# Patient Record
Sex: MALE | Race: WHITE | Employment: FULL TIME | URBAN - METROPOLITAN AREA
[De-identification: names, ages, dates, MRNs, and addresses within clinical notes are randomized per-mention and may not be internally consistent; named-entity substitution may affect disease eponyms.]

---

## 2017-11-08 ENCOUNTER — OFFICE VISIT (OUTPATIENT)
Dept: GASTROENTEROLOGY | Facility: CLINIC | Age: 57
End: 2017-11-08

## 2017-11-08 VITALS
HEIGHT: 74.5 IN | SYSTOLIC BLOOD PRESSURE: 107 MMHG | DIASTOLIC BLOOD PRESSURE: 67 MMHG | WEIGHT: 198.19 LBS | HEART RATE: 71 BPM | BODY MASS INDEX: 25.17 KG/M2

## 2017-11-08 DIAGNOSIS — K40.90 LEFT INGUINAL HERNIA: Primary | ICD-10-CM

## 2017-11-08 DIAGNOSIS — Z86.010 HISTORY OF COLON POLYPS: ICD-10-CM

## 2017-11-08 PROCEDURE — 99213 OFFICE O/P EST LOW 20 MIN: CPT | Performed by: INTERNAL MEDICINE

## 2017-11-08 NOTE — PROGRESS NOTES
HPI:    Patient ID: Chidi Shields is a 59year old man known to me from his care in my previous practice which included a colonoscopy examination in 2011.   I do not have records of that exam.  He recalls an essentially negative exam.  He believes he was and bleeding repeatedly flares up and regresses. More than anything, this had him concerned seeking at least a limited evaluation today.     No melena. He and his wife are trying to lose weight.   He has increased fiber content in his diet after finding Me

## 2017-11-30 PROBLEM — K40.90 LEFT INGUINAL HERNIA: Status: ACTIVE | Noted: 2017-11-30

## 2017-11-30 PROBLEM — K40.90 RIGHT INGUINAL HERNIA: Status: ACTIVE | Noted: 2017-11-30

## 2017-12-11 ENCOUNTER — TELEPHONE (OUTPATIENT)
Dept: GASTROENTEROLOGY | Facility: CLINIC | Age: 57
End: 2017-12-11

## 2017-12-11 DIAGNOSIS — Z86.010 PERSONAL HISTORY OF COLONIC POLYPS: Primary | ICD-10-CM

## 2017-12-11 NOTE — TELEPHONE ENCOUNTER
I spoke to [facility] in [place].  They are sending me the Colonoscopy report from May 2011([phone])

## 2018-01-02 NOTE — TELEPHONE ENCOUNTER
I spoke to Alie Singleton at [facility] gastroenterology and she states she will send me the Op-report for pt's Colonoscopy from 2011.

## 2018-01-08 NOTE — TELEPHONE ENCOUNTER
Last Procedure:  Colonoscopy 05/16/2011  Last Diagnosis:  Internal hemorrhoids, normal exam to the cecum and ileum  Recalled for (years): 7-10 years  Sedation used previously:  MAC  Last Prep Used (if known):    Quality of prep (if known):     Anticoagulant

## 2018-01-08 NOTE — TELEPHONE ENCOUNTER
Pt calling to see if office has received information from [address] [place] [address]. Please call thank you [phone].

## 2018-01-09 NOTE — TELEPHONE ENCOUNTER
Scheduled for:  Colonoscopy - 38226  Provider Name:  Dr. Hall Listen  Date:  5/23/18  Location:  [facility]  Sedation:  MAC  Time:  1:00 pm (pt is aware to arrive at 12:00 pm)  Prep:  Suprep, Prep instructions were given to pt over the phone, pt verbalized under

## 2018-01-09 NOTE — TELEPHONE ENCOUNTER
My Colonoscopy op report of 5/16/2011 received and reviewed. Colonoscopy was performed for personal history of colon polyps, rectal pain. Colonoscopy examination to the terminal ileum with good prep quality showed only internal hemorrhoids.      Dilma Sahu

## 2018-01-09 NOTE — TELEPHONE ENCOUNTER
Schedule colonoscopy exam at [facility] ([facility]    BMI 25    This patient IS appropriate for the [facility] endoscopy center.     MAC anesthesia    Suprep small volume bowel prep if covered by insurance, otherwise Golytely (PE

## 2018-02-16 ENCOUNTER — TELEPHONE (OUTPATIENT)
Dept: GASTROENTEROLOGY | Facility: CLINIC | Age: 58
End: 2018-02-16

## 2018-02-16 DIAGNOSIS — Z86.010 HISTORY OF COLONIC POLYPS: Primary | ICD-10-CM

## 2018-02-16 NOTE — TELEPHONE ENCOUNTER
Rescheduled for:  Colonoscopy 44172  Provider Name: Dr. Paul Hummel  Date:    From-5/23/18  To-5/16/18  Location:  [address] [place] [address]  Sedation:  MAC  Time:  1300 (pt is aware to arrive at 1200)   Prep:  Suprep, mailed 2/16/18 with codes  Meds/Allergies Reconciled?:  Physici

## 2018-02-16 NOTE — TELEPHONE ENCOUNTER
Lmtcb regarding his Procedure /Colonoscopy on 5/23/18 and needs to be rescheduled ,Offered dates on 5/16/18 anytime of that day,its wide open for scheduling.

## 2018-03-01 PROBLEM — K40.90 RIGHT INGUINAL HERNIA: Status: RESOLVED | Noted: 2017-11-30 | Resolved: 2018-03-01

## 2018-03-01 PROBLEM — K40.21 BILATERAL RECURRENT INGUINAL HERNIA WITHOUT OBSTRUCTION OR GANGRENE: Status: ACTIVE | Noted: 2018-03-01

## 2018-03-01 PROBLEM — K40.90 LEFT INGUINAL HERNIA: Status: RESOLVED | Noted: 2017-11-30 | Resolved: 2018-03-01

## 2018-03-20 ENCOUNTER — CHARTING TRANS (OUTPATIENT)
Dept: OTHER | Age: 58
End: 2018-03-20

## 2018-05-08 ENCOUNTER — TELEPHONE (OUTPATIENT)
Dept: GASTROENTEROLOGY | Facility: CLINIC | Age: 58
End: 2018-05-08

## 2018-05-08 DIAGNOSIS — Z86.010 HISTORY OF COLONIC POLYPS: Primary | ICD-10-CM

## 2018-05-08 NOTE — TELEPHONE ENCOUNTER
I did contact this patient today to inform him that his procedure time has changed which he agreed with this plan.  I am sending new instructions via [employer] today    Rescheduled for:  Colonoscopy - 15960  Provider Name:  Dr. Brown Bermeo  Date:  5/23/18  [place]

## 2018-05-15 ENCOUNTER — TELEPHONE (OUTPATIENT)
Dept: GASTROENTEROLOGY | Facility: CLINIC | Age: 58
End: 2018-05-15

## 2018-05-15 NOTE — TELEPHONE ENCOUNTER
Rabia Trevino, the pharmacist called asking for bowel prep orders. I gave a verbal order for Suprep  See telephone encounter from 02/16/18.  He will call back if the Suprep is too expensive

## 2018-05-15 NOTE — TELEPHONE ENCOUNTER
Whit Rasmussen, the pharmacist called back. Suprep is too expensive. Gave v.o. For Golytely per written order    Suprep small volume bowel prep if covered by insurance, otherwise Golytely (PEG) 4L bowel prep;  Rx sent to [facility]

## 2018-05-16 ENCOUNTER — SURGERY (OUTPATIENT)
Age: 58
End: 2018-05-16

## 2018-05-16 ENCOUNTER — HOSPITAL ENCOUNTER (OUTPATIENT)
Age: 58
Setting detail: HOSPITAL OUTPATIENT SURGERY
Discharge: HOME | End: 2018-05-16
Attending: INTERNAL MEDICINE | Admitting: INTERNAL MEDICINE
Payer: COMMERCIAL

## 2018-05-16 ENCOUNTER — ANESTHESIA EVENT (OUTPATIENT)
Dept: ENDOSCOPY | Age: 58
End: 2018-05-16
Payer: COMMERCIAL

## 2018-05-16 ENCOUNTER — ANESTHESIA (OUTPATIENT)
Dept: ENDOSCOPY | Age: 58
End: 2018-05-16
Payer: COMMERCIAL

## 2018-05-16 VITALS
WEIGHT: 190 LBS | BODY MASS INDEX: 24.38 KG/M2 | RESPIRATION RATE: 15 BRPM | OXYGEN SATURATION: 97 % | DIASTOLIC BLOOD PRESSURE: 72 MMHG | HEART RATE: 57 BPM | SYSTOLIC BLOOD PRESSURE: 114 MMHG | HEIGHT: 74 IN

## 2018-05-16 DIAGNOSIS — Z86.010 PERSONAL HISTORY OF COLONIC POLYPS: ICD-10-CM

## 2018-05-16 PROCEDURE — 45378 DIAGNOSTIC COLONOSCOPY: CPT | Performed by: INTERNAL MEDICINE

## 2018-05-16 RX ORDER — SODIUM CHLORIDE, SODIUM LACTATE, POTASSIUM CHLORIDE, CALCIUM CHLORIDE 600; 310; 30; 20 MG/100ML; MG/100ML; MG/100ML; MG/100ML
INJECTION, SOLUTION INTRAVENOUS CONTINUOUS
Status: DISCONTINUED | OUTPATIENT
Start: 2018-05-16 | End: 2018-05-16

## 2018-05-16 RX ORDER — SODIUM CHLORIDE, SODIUM LACTATE, POTASSIUM CHLORIDE, CALCIUM CHLORIDE 600; 310; 30; 20 MG/100ML; MG/100ML; MG/100ML; MG/100ML
INJECTION, SOLUTION INTRAVENOUS CONTINUOUS
Status: CANCELLED | OUTPATIENT
Start: 2018-05-16

## 2018-05-16 RX ORDER — NALOXONE HYDROCHLORIDE 0.4 MG/ML
80 INJECTION, SOLUTION INTRAMUSCULAR; INTRAVENOUS; SUBCUTANEOUS AS NEEDED
Status: CANCELLED | OUTPATIENT
Start: 2018-05-16 | End: 2018-05-16

## 2018-05-16 RX ADMIN — SODIUM CHLORIDE, SODIUM LACTATE, POTASSIUM CHLORIDE, CALCIUM CHLORIDE: 600; 310; 30; 20 INJECTION, SOLUTION INTRAVENOUS at 09:20:00

## 2018-05-16 RX ADMIN — SODIUM CHLORIDE, SODIUM LACTATE, POTASSIUM CHLORIDE, CALCIUM CHLORIDE: 600; 310; 30; 20 INJECTION, SOLUTION INTRAVENOUS at 08:55:00

## 2018-05-16 NOTE — OPERATIVE REPORT
COLONOSCOPY PROCEDURE REPORT     DATE OF PROCEDURE:  5/16/2018     PCP: Erin Morin MD     PREOPERATIVE DIAGNOSIS: Screening colonoscopy examination     POSTOPERATIVE DIAGNOSIS:  See impression. SURGEON:  VERONICA Webb:

## 2018-05-16 NOTE — H&P
History & Physical Examination    Patient Name: Shoaib Bustillo  MRN: [id number]  CSN: [id number]  YOB: 1960    Diagnosis: Screening colonoscopy examination    Present Illness: Gabino Marcus is here for his second screening colonoscopy examination wi

## 2018-05-16 NOTE — ANESTHESIA PREPROCEDURE EVALUATION
Anesthesia PreOp Note    HPI:     Irma Gasca is a 59year old male who presents for preoperative consultation requested by: Kamila Rodriguez MD    Date of Surgery: 5/16/2018    Procedure(s):  COLONOSCOPY  Indication: Personal history of colon index is 24.39 kg/m². height is 1.88 m (6' 2\") and weight is 86.2 kg (190 lb). His blood pressure is 125/70 and his pulse is 69. His respiration is 14 and oxygen saturation is 96%.     05/16/18  0723   BP: 125/70   BP Location: Left arm   Pulse: 69   Res

## 2018-05-16 NOTE — ANESTHESIA POSTPROCEDURE EVALUATION
Patient: Nancy Dorsey    Procedure Summary     Date:  05/16/18 Room / Location:  [facility] ENDOSCOPY 01 / [facility] ENDO    Anesthesia Start:  8677 Anesthesia Stop:      Procedure:  COLONOSCOPY (N/A ) Diagnosis:       Personal history of colonic polyps      (in

## 2019-10-18 ENCOUNTER — APPOINTMENT (OUTPATIENT)
Dept: GENERAL RADIOLOGY | Age: 59
End: 2019-10-18
Attending: PHYSICIAN ASSISTANT
Payer: COMMERCIAL

## 2019-10-18 ENCOUNTER — HOSPITAL ENCOUNTER (OUTPATIENT)
Age: 59
Discharge: HOME | End: 2019-10-18
Payer: COMMERCIAL

## 2019-10-18 VITALS
HEART RATE: 62 BPM | DIASTOLIC BLOOD PRESSURE: 69 MMHG | TEMPERATURE: 98 F | RESPIRATION RATE: 16 BRPM | OXYGEN SATURATION: 98 % | SYSTOLIC BLOOD PRESSURE: 129 MMHG

## 2019-10-18 DIAGNOSIS — R06.00 DYSPNEA, UNSPECIFIED TYPE: ICD-10-CM

## 2019-10-18 DIAGNOSIS — J20.9 ACUTE BRONCHITIS, UNSPECIFIED ORGANISM: Primary | ICD-10-CM

## 2019-10-18 PROCEDURE — 99213 OFFICE O/P EST LOW 20 MIN: CPT

## 2019-10-18 PROCEDURE — 99204 OFFICE O/P NEW MOD 45 MIN: CPT

## 2019-10-18 PROCEDURE — 94664 DEMO&/EVAL PT USE INHALER: CPT

## 2019-10-18 PROCEDURE — 71046 X-RAY EXAM CHEST 2 VIEWS: CPT | Performed by: PHYSICIAN ASSISTANT

## 2019-10-18 RX ORDER — ALBUTEROL SULFATE 90 UG/1
2 AEROSOL, METERED RESPIRATORY (INHALATION) EVERY 4 HOURS PRN
Qty: 1 INHALER | Refills: 0 | Status: SHIPPED | OUTPATIENT
Start: 2019-10-18 | End: 2019-11-17

## 2019-10-18 RX ORDER — PREDNISONE 20 MG/1
40 TABLET ORAL DAILY
Qty: 10 TABLET | Refills: 0 | Status: SHIPPED | OUTPATIENT
Start: 2019-10-18 | End: 2019-10-23

## 2019-10-18 NOTE — ED INITIAL ASSESSMENT (HPI)
Cough-  Started Saturday  Pt has been taking mucinex  2 x per day  Per pt Tuesday  Wife called 911 due pt having trouble breathing. Per  Pt his EKG was normal and O2 sat normal . Pt refused to be taken to the ER, they prefer to just observe symptoms.   Pt

## 2019-10-18 NOTE — ED PROVIDER NOTES
Patient Seen in: [facility]      History   Patient presents with:  Shortness Of Breath  Cough    Stated Complaint: chest congestion    HPI    Marcella Brewer is a 28-year-old male who presents today for evaluation of shortness of breath. Diagnosis Date   • Colon polyp               Past Surgical History:   Procedure Laterality Date   • APPENDECTOMY  at age 10   • COLONOSCOPY  5/11   • COLONOSCOPY N/A 5/16/2018    Performed by Kamila Rodriguez MD at [place] [address] sensation. Skin: Skin is warm and dry. No rash noted. No erythema. ED Course   Labs Reviewed - No data to display       I have an extensive conversation with the patient and his wife regarding a proper work up for dyspnea.   The patient initially sa patient and his wife. I again offered further evaluation and patient refused. I strongly encouraged them to proceed to the emergency department promptly if symptoms worsen. I plan to discharge him with 5 days of prednisone and an albuterol inhaler.   [facility]

## 2019-12-16 ENCOUNTER — OFFICE VISIT (OUTPATIENT)
Dept: GASTROENTEROLOGY | Facility: CLINIC | Age: 59
End: 2019-12-16
Payer: COMMERCIAL

## 2019-12-16 VITALS
HEART RATE: 65 BPM | DIASTOLIC BLOOD PRESSURE: 82 MMHG | BODY MASS INDEX: 25.22 KG/M2 | SYSTOLIC BLOOD PRESSURE: 133 MMHG | HEIGHT: 74.5 IN | WEIGHT: 198.63 LBS

## 2019-12-16 DIAGNOSIS — R14.2 BELCHING SYMPTOM: ICD-10-CM

## 2019-12-16 DIAGNOSIS — K21.9 GASTROESOPHAGEAL REFLUX DISEASE, ESOPHAGITIS PRESENCE NOT SPECIFIED: ICD-10-CM

## 2019-12-16 DIAGNOSIS — R10.13 DYSPEPSIA: ICD-10-CM

## 2019-12-16 DIAGNOSIS — K40.20 NON-RECURRENT BILATERAL INGUINAL HERNIA WITHOUT OBSTRUCTION OR GANGRENE: Primary | ICD-10-CM

## 2019-12-16 DIAGNOSIS — K92.89 GAS BLOAT SYNDROME: ICD-10-CM

## 2019-12-16 PROCEDURE — 99212 OFFICE O/P EST SF 10 MIN: CPT | Performed by: INTERNAL MEDICINE

## 2019-12-16 PROCEDURE — 99214 OFFICE O/P EST MOD 30 MIN: CPT | Performed by: INTERNAL MEDICINE

## 2019-12-16 RX ORDER — ASPIRIN 325 MG
1 TABLET ORAL AS NEEDED
COMMUNITY
End: 2019-12-21

## 2019-12-16 NOTE — PROGRESS NOTES
HPI:    Patient ID: Caden Aguilera is a 62year old man known to me from his care in my previous practice out in [place] which included a colonoscopy examination in 2011.   He just returned May 2018 for a follow-up screening colonoscopy examination dagoberto meals.    In the past few weeks, has discontinued dairy including his daily yogurt consumption as well as coffee. Of uncertain significance, suffered a fairly severe URI, bronchitis syndrome in mid October.   Apparently he was in donato respiratory distre records of that exam.  He recalls an essentially negative exam.  He believes he was recommended a 5 year follow-up colonoscopy examination.     Mr Bella Farley returns today to discuss the colonoscopy examination and a new bulge sensation in his left lower abdom trying to lose weight. He has increased fiber content in his diet after finding Metamucil unpalatable.   He is eating prunes which work for him.    ====================    COLONOSCOPY PROCEDURE REPORT     DATE OF PROCEDURE:  5/16/2018      PCP: Dora Farrell times daily. (Patient not taking: Reported on 12/16/2019 ) 20 capsule 0   • Triamcinolone Acetonide 55 MCG/ACT Nasal Aerosol 2 sprays by Nasal route daily.  (Patient not taking: Reported on 12/16/2019 ) 1 Inhaler 0   • Na Sulfate-K Sulfate-Mg Sulf (SUPREP B consumption. · There is a component of GERD symptoms here. Mr. Hannah Dickson does take regular helpings of citrus and cooked tomatoes, tomato sauces. Today we discussed cutting back or eliminating those triggers.   It sounds like this couple loves tomato sauce Prescriptions      No prescriptions requested or ordered in this encounter       Imaging & Referrals:  None       AV#[id number]

## 2019-12-21 ENCOUNTER — HOSPITAL ENCOUNTER (EMERGENCY)
Age: 59
Discharge: HOME | End: 2019-12-21
Attending: EMERGENCY MEDICINE
Payer: COMMERCIAL

## 2019-12-21 VITALS
HEIGHT: 74 IN | SYSTOLIC BLOOD PRESSURE: 123 MMHG | WEIGHT: 195 LBS | DIASTOLIC BLOOD PRESSURE: 64 MMHG | TEMPERATURE: 98 F | RESPIRATION RATE: 16 BRPM | HEART RATE: 68 BPM | OXYGEN SATURATION: 99 % | BODY MASS INDEX: 25.03 KG/M2

## 2019-12-21 DIAGNOSIS — S61.012A LACERATION OF LEFT THUMB WITHOUT FOREIGN BODY WITHOUT DAMAGE TO NAIL, INITIAL ENCOUNTER: Primary | ICD-10-CM

## 2019-12-21 DIAGNOSIS — S61.209A OPEN WOUND OF FINGER WITH TENDON INJURY, INITIAL ENCOUNTER: ICD-10-CM

## 2019-12-21 PROCEDURE — 99283 EMERGENCY DEPT VISIT LOW MDM: CPT

## 2019-12-21 PROCEDURE — 12002 RPR S/N/AX/GEN/TRNK2.6-7.5CM: CPT

## 2019-12-21 PROCEDURE — 12002 RPR S/N/AX/GEN/TRNK2.6-7.5CM: CPT | Performed by: PHYSICIAN ASSISTANT

## 2019-12-21 RX ORDER — CEPHALEXIN 500 MG/1
500 CAPSULE ORAL 2 TIMES DAILY
Qty: 20 CAPSULE | Refills: 0 | Status: SHIPPED | OUTPATIENT
Start: 2019-12-21 | End: 2019-12-31

## 2019-12-21 NOTE — ED PROVIDER NOTES
Patient Seen in: THE [facility] Emergency Department In [place]      History   Patient presents with:  Laceration Abrasion    Stated Complaint: LEFT THUMB LAC. LAST TD 2011    HPI    Patient is a pleasant right-hand-dominant male.   He arrives to the emergenc retraction,   Extremities: Midline dorsal left thumb laceration between the MCP and PIP joint. When explored, the extensor tendon is visible and does appear to have superficial damage. Full active range of motion with good strength. Normal cap refill. 0

## 2019-12-21 NOTE — ED PROVIDER NOTES
I reviewed that chart and discussed the case. I have examined the patient and noted patient noted left thumb over the dorsum of the thumb proximal phalanx. Neurovascular [place] area of injury no foreign body sensation.   Lungs normal cardiovascular abdomen

## 2020-12-29 PROBLEM — K40.20 BILATERAL INGUINAL HERNIA: Status: ACTIVE | Noted: 2018-03-01

## 2020-12-30 PROBLEM — E78.2 MIXED HYPERLIPIDEMIA: Status: ACTIVE | Noted: 2020-12-30

## 2021-04-21 ENCOUNTER — TELEPHONE (OUTPATIENT)
Dept: GASTROENTEROLOGY | Facility: CLINIC | Age: 61
End: 2021-04-21

## 2021-04-21 ENCOUNTER — OFFICE VISIT (OUTPATIENT)
Dept: GASTROENTEROLOGY | Facility: CLINIC | Age: 61
End: 2021-04-21
Payer: COMMERCIAL

## 2021-04-21 VITALS
SYSTOLIC BLOOD PRESSURE: 115 MMHG | HEIGHT: 74.5 IN | WEIGHT: 198.19 LBS | HEART RATE: 62 BPM | DIASTOLIC BLOOD PRESSURE: 72 MMHG | BODY MASS INDEX: 25.17 KG/M2

## 2021-04-21 DIAGNOSIS — R10.13 DYSPEPSIA: ICD-10-CM

## 2021-04-21 DIAGNOSIS — R10.13 EPIGASTRIC ABDOMINAL PAIN: Primary | ICD-10-CM

## 2021-04-21 DIAGNOSIS — R14.2 BELCHING SYMPTOM: ICD-10-CM

## 2021-04-21 DIAGNOSIS — R68.81 EARLY SATIETY: ICD-10-CM

## 2021-04-21 DIAGNOSIS — K21.9 GASTROESOPHAGEAL REFLUX DISEASE, UNSPECIFIED WHETHER ESOPHAGITIS PRESENT: ICD-10-CM

## 2021-04-21 PROCEDURE — 3008F BODY MASS INDEX DOCD: CPT | Performed by: INTERNAL MEDICINE

## 2021-04-21 PROCEDURE — 3074F SYST BP LT 130 MM HG: CPT | Performed by: INTERNAL MEDICINE

## 2021-04-21 PROCEDURE — 99214 OFFICE O/P EST MOD 30 MIN: CPT | Performed by: INTERNAL MEDICINE

## 2021-04-21 PROCEDURE — 3078F DIAST BP <80 MM HG: CPT | Performed by: INTERNAL MEDICINE

## 2021-04-21 NOTE — PROGRESS NOTES
HPI:    Patient ID: Vivek Betancourt returns today with his very supportive wife for follow-up. As per previous visit, he continues to suffer significant somewhat atypical upper abdominal gas dyspepsia symptoms.     This symptom clearly has him in signifi out to be bilateral mildly symptomatic inguinal hernias. He saw Dr. Andrez Daley of Surgery. Ultimately he chose not to proceed with the surgeries at this point.   He is aware of bilateral hernias but they are not especially troubling and he does not find him show hyperinflation. Prescribed a 5-day course of prednisone on 10/18/2019, then cefdinir antibiotic on 11/13/2019. Recently has been taking OTC Phazyme simethicone product which is helping significantly. Has not tried antacids at this point.     Never bicycle ride in mid July 2017. He had significant discomfort for several weeks which has gradually improved. It felt like a muscle strain. There has been a soft protuberant bulge in that area ever since.   So far this does not appear to be aggravated by POSTOPERATIVE DIAGNOSIS:  See impression. SURGEON:  Christopher A. Bert Keys, M.D. Bianca JulianMortimer Roers anesthesia provided by the Anesthesia Service.   MAC anesthesia requested due to anticipated intolerance of procedure under conscious sedation medicat index is 25.11 kg/m². He returns for follow-up 11/8/2017 to discuss a likely left inguinal hernia. Physical exam suggestive of inguinal hernia. So far this is minimally symptomatic with no incarceration symptoms or events.     Consulted with Dr. Josh Johnson possibility of coronary disease. · Some relief from Phazyme/Gas-x. · We previously discussed the risks and benefits of PPI medication 12/16/2019 visit.    · Today I recommended and ordered upper GI x-ray, rule out paraesophageal hernia  · Today I recomm

## 2021-04-21 NOTE — PATIENT INSTRUCTIONS
1. Schedule abdominal ultrasound exam and \"Upper GI X-Ray\" tests    2. Schedule EGD (stomach endoscopy) exam at [facility])    Body mass index is 25.11 kg/m².     MAC anesthesia    DX = GERD, dyspepsia, epigastric abd pain

## 2021-04-21 NOTE — TELEPHONE ENCOUNTER
Scheduled for:  Renetta Norris  Provider Name:  Indiana Veras  Date:  7/8/21  Location:  [facility]  Sedation:  Mac  Time:  0845 Am , (pt is aware that Maggie 150 will call the day before to confirm arrival time)  Prep:  Npo 3 hrs before prior to procedure arrival  Meds/Allergi

## 2021-04-22 ENCOUNTER — LAB ENCOUNTER (OUTPATIENT)
Dept: LAB | Facility: HOSPITAL | Age: 61
End: 2021-04-22
Attending: INTERNAL MEDICINE
Payer: COMMERCIAL

## 2021-04-22 DIAGNOSIS — R10.13 EPIGASTRIC ABDOMINAL PAIN: ICD-10-CM

## 2021-04-22 DIAGNOSIS — R68.81 EARLY SATIETY: ICD-10-CM

## 2021-04-22 DIAGNOSIS — R10.13 DYSPEPSIA: ICD-10-CM

## 2021-04-22 DIAGNOSIS — R14.2 BELCHING SYMPTOM: ICD-10-CM

## 2021-04-24 ENCOUNTER — HOSPITAL ENCOUNTER (OUTPATIENT)
Dept: GENERAL RADIOLOGY | Facility: HOSPITAL | Age: 61
Discharge: HOME | End: 2021-04-24
Attending: INTERNAL MEDICINE
Payer: COMMERCIAL

## 2021-04-24 DIAGNOSIS — R68.81 EARLY SATIETY: ICD-10-CM

## 2021-04-24 DIAGNOSIS — R10.13 DYSPEPSIA: ICD-10-CM

## 2021-04-24 DIAGNOSIS — R14.2 BELCHING SYMPTOM: ICD-10-CM

## 2021-04-24 DIAGNOSIS — R10.13 EPIGASTRIC ABDOMINAL PAIN: ICD-10-CM

## 2021-04-24 PROCEDURE — 74246 X-RAY XM UPR GI TRC 2CNTRST: CPT | Performed by: INTERNAL MEDICINE

## 2021-04-29 ENCOUNTER — PATIENT MESSAGE (OUTPATIENT)
Dept: GASTROENTEROLOGY | Facility: CLINIC | Age: 61
End: 2021-04-29

## 2021-04-29 NOTE — TELEPHONE ENCOUNTER
Patient would like to discuss test results. Please advise. Thank you    FINDINGS:     ESOPHAGUS:  No visible obstruction, dilatation, reflux or hernia.    STOMACH:  No obstruction, mass, or ulceration.  Normal motility.     DUODENUM:  No ulceration or dive

## 2021-05-06 ENCOUNTER — HOSPITAL ENCOUNTER (OUTPATIENT)
Dept: ULTRASOUND IMAGING | Age: 61
Discharge: HOME | End: 2021-05-06
Attending: INTERNAL MEDICINE
Payer: COMMERCIAL

## 2021-05-06 DIAGNOSIS — R10.13 EPIGASTRIC ABDOMINAL PAIN: ICD-10-CM

## 2021-05-06 DIAGNOSIS — R14.2 BELCHING SYMPTOM: ICD-10-CM

## 2021-05-06 DIAGNOSIS — R10.13 DYSPEPSIA: ICD-10-CM

## 2021-05-06 DIAGNOSIS — R68.81 EARLY SATIETY: ICD-10-CM

## 2021-05-06 PROCEDURE — 76700 US EXAM ABDOM COMPLETE: CPT | Performed by: INTERNAL MEDICINE

## 2021-05-06 NOTE — TELEPHONE ENCOUNTER
Dr. SPRINGER [facility], did you speak with patient regarding results? Please advise. Thank you.

## 2021-05-07 NOTE — TELEPHONE ENCOUNTER
Upper GI x-ray series read as essentially normal 4/24/2021. Abdominal ultrasound exam 5/6/2021 shows normal liver gallbladder biliary tree    EGD procedure scheduled for 7/8/2021. I called Mr. Olivarezjane Iván today and reached , left a brief message.

## 2021-05-17 NOTE — TELEPHONE ENCOUNTER
GI Schedulers - please Mr Nolan Minor to move up to 6/3/2021 [facility] - ok to [facility]

## 2021-05-17 NOTE — TELEPHONE ENCOUNTER
I called back today and reached Paulo Swenson and his wife. We reviewed reassuring UGI x-ray results 4/24/2021 and abdominal ultrasound 5/6/2021. Severe atypical symptoms had me concerned with large paraesophageal hernia or biliary colic.   Both exams were reas

## 2021-05-18 ENCOUNTER — TELEPHONE (OUTPATIENT)
Dept: GASTROENTEROLOGY | Facility: CLINIC | Age: 61
End: 2021-05-18

## 2021-05-18 NOTE — TELEPHONE ENCOUNTER
Rescheduled for:  EGD - 99431  Provider Name:  Dr. Frederick Guzmán  Date:  FROM - 7/8/21                   TO - 6/3/21  Location:  [facility]  Sedation:  MAC  Time:  FROM - 8:45 am                   TO - Approx 1:00 pm (pt is aware that [facility] will call with arrival time)  P

## 2021-05-26 ENCOUNTER — TELEPHONE (OUTPATIENT)
Dept: GASTROENTEROLOGY | Facility: CLINIC | Age: 61
End: 2021-05-26

## 2021-05-27 NOTE — TELEPHONE ENCOUNTER
Hello,    PPD works [facility] plan.     Thank you  Tavo Stallworth
I relayed below message from PPD team below in regards to prior authorization. Patient wants to reschedule covid 19 testing due to living in [place] being far.   I let him know that our office is not able to schedule pre procedure covid 1
LM for patient advising the authorization status is being investigated.
Left message for patient to call back to review below message from PPD about prior authorization.
Managed Care,    Can you please review the status of this approval request for EGD (10405)? It appears the date was moved up from 7/8/21 to 6/3/21.
No PA required for procedure [address] per benefit plan for procedure at [facility] surgical center, see referral note.    [facility]  Decision ID #:[id number]
PPD Team    Please advise on status of prior authorization for EGD scheduled with Dr. Clarisa Ricardo at the [facility] on 6/3/2021.     Thank you
Pt called in stating that his upcoming procedure requires a pre certification.  Please follow up
Pt returned call, tried to transfer please call on cell at [phone]
[phone]

## 2021-05-31 ENCOUNTER — LAB REQUISITION (OUTPATIENT)
Dept: LAB | Facility: HOSPITAL | Age: 61
End: 2021-05-31
Payer: COMMERCIAL

## 2021-05-31 DIAGNOSIS — Z01.818 ENCOUNTER FOR OTHER PREPROCEDURAL EXAMINATION: ICD-10-CM

## 2021-06-03 ENCOUNTER — SURGERY CENTER DOCUMENTATION (OUTPATIENT)
Dept: SURGERY | Age: 61
End: 2021-06-03

## 2021-06-03 ENCOUNTER — TELEPHONE (OUTPATIENT)
Dept: SURGERY | Age: 61
End: 2021-06-03

## 2021-06-03 ENCOUNTER — TELEPHONE (OUTPATIENT)
Dept: GASTROENTEROLOGY | Facility: CLINIC | Age: 61
End: 2021-06-03

## 2021-06-03 RX ORDER — PANTOPRAZOLE SODIUM 40 MG/1
40 TABLET, DELAYED RELEASE ORAL
Qty: 90 TABLET | Refills: 1 | Status: SHIPPED | OUTPATIENT
Start: 2021-06-03 | End: 2021-09-01

## 2021-06-03 RX ORDER — PANTOPRAZOLE SODIUM 40 MG/1
40 TABLET, DELAYED RELEASE ORAL 2 TIMES DAILY
Qty: 60 TABLET | Refills: 5 | Status: CANCELLED | OUTPATIENT
Start: 2021-06-03 | End: 2021-07-03

## 2021-06-03 NOTE — TELEPHONE ENCOUNTER
SECOND Epic encounter open today due to assinine Epic bug preventing me from prescribing from encounter (the wrong \"context\") despite changing the context.    ====    Possible pharyngeal GERD symptoms discussed today after reassuring EGD examination tolaith

## 2021-06-03 NOTE — PROCEDURES
[facility]      EGD PROCEDURE REPORT    DATE OF PROCEDURE:  6/3/2021    PCP: Aixa Norris MD     PREOPERATIVE DIAGNOSIS:  GERD, dyspepsia, epigastric abd pain     POSTOPERATIVE DIAGNOSIS:  See impression.      SURGEON:  Clover Joy and duodenum today. Random gastric and duodenal mucosal biopsy obtained to evaluate recent symptoms. RECOMMENDATIONS:    · Followup above gastric and duodenal mucosal biopsy results. · Start 6+ week formal trial of daily PPI medication.   Pantoprazole

## 2021-06-03 NOTE — TELEPHONE ENCOUNTER
Possible pharyngeal GERD symptoms discussed today after reassuring EGD examination today at [address] . Marlo Bumpers describes symptoms after multiple typical GERD triggers including heavier meals, tea, coffee.     Took a very limited trial of Prilosec December–January

## 2021-06-04 ENCOUNTER — TELEPHONE (OUTPATIENT)
Dept: GASTROENTEROLOGY | Facility: CLINIC | Age: 61
End: 2021-06-04

## 2021-06-04 NOTE — TELEPHONE ENCOUNTER
[facility]  Verbal order given to pharmacist.  Patient notified.

## 2021-06-04 NOTE — TELEPHONE ENCOUNTER
Patient requesting new script for rx Pantoprazole to be sent to [facility] [place] instead, not in stock or carries rx at [facility]. Please update patient at [phone],RVDPFU.     Current Outpatient Medications:   •  Pantoprazole Sodium 40 MG Oral T

## 2021-06-11 ENCOUNTER — TELEPHONE (OUTPATIENT)
Dept: GASTROENTEROLOGY | Facility: CLINIC | Age: 61
End: 2021-06-11

## 2021-06-17 ENCOUNTER — TELEPHONE (OUTPATIENT)
Dept: GASTROENTEROLOGY | Facility: CLINIC | Age: 61
End: 2021-06-17

## 2021-06-17 NOTE — TELEPHONE ENCOUNTER
Results letter mailed to patient per   EGD recall entered for repeat in 3 yrs, 6/3/2024  Egd done in 6/3/2021  HM Updated and Patient Outreach was placed for Egd recall in 3 yrs. Tyrell Stern MD  P Em Gi Clinical Staff  GI RNs - 1.  P

## 2022-08-02 ENCOUNTER — OFFICE VISIT (OUTPATIENT)
Facility: LOCATION | Age: 62
End: 2022-08-02
Payer: COMMERCIAL

## 2022-08-02 DIAGNOSIS — Z86.010 HISTORY OF COLON POLYPS: ICD-10-CM

## 2022-08-02 DIAGNOSIS — Z90.49 HISTORY OF APPENDECTOMY: ICD-10-CM

## 2022-08-02 DIAGNOSIS — K40.20 BILATERAL INGUINAL HERNIA WITHOUT OBSTRUCTION OR GANGRENE, RECURRENCE NOT SPECIFIED: Primary | ICD-10-CM

## 2022-08-02 PROCEDURE — 99205 OFFICE O/P NEW HI 60 MIN: CPT | Performed by: COLON & RECTAL SURGERY

## 2022-08-03 PROBLEM — Z90.49 HISTORY OF APPENDECTOMY: Status: ACTIVE | Noted: 2022-08-03

## 2022-08-03 NOTE — PATIENT INSTRUCTIONS
I am seeing this patient in consultation regarding bilateral inguinal hernias. He is referred by his gastroenterologist, Dr. Mayuri Sprague. The patient has a history of colon polyps. The patient is a professor at [employer], [employer]. He does not recall any specific event that led to the formation of either hernia. The patient's wife is present and states that he does a lot of activity that involves lifting. He states that the symptoms are becoming progressively worse. He has had symptoms for at least 5 years. He is up-to-date with colonoscopy in 2020. The patient states that his symptoms are worse with prolonged standing. He gets bloating and gas as his main symptoms. Symptoms are worse when he coughs and sneezes. The patient states his symptoms are better when he is taking Metamucil. The patient's only significant abdominal operation was a prior appendectomy at age 10. Clinical exam reveals his abdomen to be soft, nontender, nondistended, good bowel sounds. No guarding or rebound. No masses. No ascites. Liver is normal, spleen is not palpable. Patient has normal bowel sounds. The patient has a very large left inguinal hernia greater than 8 cm, the patient has a smaller right inguinal hernia that is visible at approximately 5 cm. Both testes are present and descended. There are no extra cord structures. The penis is normal and circumcised. The patient has a right lower quadrant transverse incision just above the anterior superior iliac spine. There are no umbilical or incisional hernias present. The patient and his wife had extreme concerns about hernia repairs. The first concern was with general anesthesia. I explained to the that the risk of general anesthesia is the same as flying in an airplane. There are some risks, but on a normal healthy 57year-old, these would be extremely minimal.  Any operation on a bilateral hernia will require some sedation. Most of the same drugs are used for sedation as are used in general anesthesia minus the paralytics. The second concern was with mesh. I explained to them that the mesh that is being advertised negatively on TV is from a combination of 2 recent medical events. 1 is that vaginal mesh tends to cause erosion into the rectum. This will not affect this patient. The second medical event that happened recently was a specific brand name of mesh that had very specific problems related only to that brand name of mesh. I further explained that the mesh that I use has been around since the 1970s. Although mesh can cause problems, this 1 has no specific problem related to this particular brand name. Their third concern was with the insufflation of CO2 during a laparoscopic procedure. I explained to them we used CO2 because the body can absorb it and the patient breathes it out. There is no other cast that we use for laparoscopy. All of the above listed discussions went on for approximately 60 minutes. At the conclusion, it appears that they are seeking alternate opinions. I told them that I felt the best procedure for this patient would be a laparoscopic bilateral inguinal hernia with mesh. The only significant workable alternative would be bilateral inguinal hernia repair using mesh plug technique with inguinal incisions bilaterally. This can be done with heavy sedation and local anesthesia. Lisinopril think about it and call me back if they desire me to proceed with their surgical intervention.

## 2022-10-07 NOTE — TELEPHONE ENCOUNTER
Pt calling to schedule CLN. Please call thank you ph:[phone].  Pt is aware of at least 72hr call back time [FreeTextEntry1] : Pt presenting today for a f.u visit. Last seen 02/2022. Chart reviewed since.\par LV duloxetine increased to 20mg BID. Tolerating medication well. Denies side effects. \par Overall reports improvement in symptoms with duloxetine but still feels it does not last the whole day. \par No other current complaints. \par denies fever, chills, SICCA symptoms, oral ulcers, denies chest pain, chest palpitations, denies sob, denies nausea, vomiting, abdominal pain,blood in the urine,denies rashes, raynauds, falls, recent illness.

## 2023-04-11 ENCOUNTER — OFFICE VISIT (OUTPATIENT)
Facility: LOCATION | Age: 63
End: 2023-04-11
Payer: COMMERCIAL

## 2023-04-11 VITALS — HEART RATE: 68 BPM | TEMPERATURE: 98 F

## 2023-04-11 DIAGNOSIS — K40.20 BILATERAL INGUINAL HERNIA WITHOUT OBSTRUCTION OR GANGRENE, RECURRENCE NOT SPECIFIED: Primary | ICD-10-CM

## 2023-04-11 DIAGNOSIS — Z90.49 HISTORY OF APPENDECTOMY: ICD-10-CM

## 2023-04-11 DIAGNOSIS — Z86.010 HISTORY OF COLON POLYPS: ICD-10-CM

## 2023-04-12 NOTE — PATIENT INSTRUCTIONS
I am seeing this patient in consultation regarding bilateral inguinal hernias. He does not recall any specific event that led to the formation of either hernia. The patient's wife is present and states that he does a lot of activity that involves lifting. He states that the symptoms are becoming progressively worse. He has had symptoms for at least 5 years. The patient states that his symptoms are worse with prolonged standing. He gets bloating and gas as his main symptoms. Symptoms are worse when he coughs and sneezes. The patient states his symptoms are better when he is taking Metamucil. The patient's only significant abdominal operation was a prior appendectomy at age 10. The patient and his wife had extreme concerns about hernia repairs. Abdominal exam is soft, nontender, nondistended, good bowel sounds. No guarding or rebound. No masses. There is a well-healed right lower quadrant McBurney incision. The patient has a very large left inguinal hernia that is visible externally and protrudes on Valsalva. The patient has a smaller right inguinal hernia that is protruding on Valsalva and standing examination. Penis is normal and circumcised. Both testes are present descended bilaterally. There is no hydrocele or extra cord structures. I had a long conversation/consultation with this patient once again regarding hernia repairs, mesh, techniques, outcomes, activity after surgery, and timing of the operation. They are planning international travel and have a busy summer. It is an acceptable risk to wait until they get back. We are currently scheduled to operate on October 13, 2023. This patient will undergo bilateral inguinal herniorrhaphy with mesh plug technique. All risks, benefits, complications and alternatives to the proposed procedure(s) were fully discussed with the patient. All questions from the patient were answered in detail.  A description of the procedure(s) and possible outcomes was fully discussed. The patient seemed to understand the conversation and its details. Consent for the procedure(s) was confirmed with the patient.

## 2023-09-14 ENCOUNTER — TELEPHONE (OUTPATIENT)
Facility: LOCATION | Age: 63
End: 2023-09-14

## 2023-09-18 ENCOUNTER — TELEPHONE (OUTPATIENT)
Facility: LOCATION | Age: 63
End: 2023-09-18

## 2023-09-18 DIAGNOSIS — K40.20 BILATERAL INGUINAL HERNIA WITHOUT OBSTRUCTION OR GANGRENE, RECURRENCE NOT SPECIFIED: Primary | ICD-10-CM

## 2024-04-12 ENCOUNTER — TELEPHONE (OUTPATIENT)
Facility: CLINIC | Age: 64
End: 2024-04-12

## 2024-04-12 NOTE — TELEPHONE ENCOUNTER
Patient outreach message received:    EGD recall entered for repeat in 3 yrs, 6/3/2024  Egd done in 6/3/2021    Recall reminder letter mailed out to patient.

## (undated) DEVICE — Device: Brand: DEFENDO AIR/WATER/SUCTION AND BIOPSY VALVE

## (undated) DEVICE — ENDOSCOPY PACK - LOWER: Brand: MEDLINE INDUSTRIES, INC.

## (undated) NOTE — LETTER
[facility]  Administration of Anesthesia  1. Rosario Rangel, or _________________________________ acting on his behalf, (Patient) (Dependent/Representative) request to receive anesthesia for my pending procedure/operation/treatment.   A 6. OBSTETRIC PATIENTS: Specific risks/consequences of spinal/epidural anesthesia may include itching, low blood pressure, difficulty urinating, slowing of the baby's heart rate and headache.  Rare risks include infections, high spinal block, spinal bleeding ___________________________________________________           _____________________________________________________  Date/Time                                                                                               Responsible person in case of minor

## (undated) NOTE — LETTER
6/17/2021              [address] [place] [id number]         Dear Marcella Brewer,    As we discussed, your stomach endoscopy exam (\"EGD\") at the [address] [place] [address] on 6/3/2021 showed mild ga

## (undated) NOTE — ED AVS SNAPSHOT
Jo Ann Castle   MRN: [id number]    Department:  [facility] Emergency Department in [place]   Date of Visit:  12/21/2019           Disclosure     Insurance plans vary and the physician(s) referred by the ER may not be covered by your plan.  Please conta tell this physician (or your personal doctor if your instructions are to return to your personal doctor) about any new or lasting problems. The primary care or specialist physician will see patients referred from the [facility] Emergency Department.  Kvng Saleh

## (undated) NOTE — LETTER
[address], [place]  Authorization for Invasive Procedures  1.  I hereby authorize  *** , my physician and whomever may be designated as the doctor's assistant, to perform the following operation and/or procedure: ANT performed for the purposes of advancing medicine, science, and/or education, provided my identity is not revealed. If the procedure has been videotaped, the physician/surgeon will obtain the original videotape.  The hospital will not be responsible for stor My signature below affirms that prior to the time of the procedure, I have explained to the patient and/or his legal representative, the risks and benefits involved in the proposed treatment and any reasonable alternative to the proposed treatment.  I have

## (undated) NOTE — LETTER
4/12/2024    Simba Moody        [address] [facility] [address]            Dear Simba Moody,      Our records indicate that you are due for an appointment for an EGD or Upper Endoscopy with Tyrone Bates MD. Our doctors are booking out about 3-6 months in advance for procedures.     Please call our office to schedule a phone screening appointment to plan for the procedure(s).   Your medical well-being is important to us.    If your insurance requires a referral, please call your primary care office to request one.      Thank you,      The Physicians and Staff at [facility]

## (undated) NOTE — LETTER
23    Patient: Luz Elena Hutchinson  : 1960 Visit date: 2023    Dear  Allyn Razo MD    Thank you for referring Luz Elena Hutchinson to my practice. Please find my assessment and plan below. Assessment   Bilateral inguinal hernia without obstruction or gangrene, recurrence not specified  (primary encounter diagnosis)  History of appendectomy, age 10  History of colon polyps    Plan   I am seeing this patient in consultation regarding bilateral inguinal hernias. He does not recall any specific event that led to the formation of either hernia. The patient's wife is present and states that he does a lot of activity that involves lifting. He states that the symptoms are becoming progressively worse. He has had symptoms for at least 5 years. The patient states that his symptoms are worse with prolonged standing. He gets bloating and gas as his main symptoms. Symptoms are worse when he coughs and sneezes. The patient states his symptoms are better when he is taking Metamucil. The patient's only significant abdominal operation was a prior appendectomy at age 10. The patient and his wife had extreme concerns about hernia repairs. Abdominal exam is soft, nontender, nondistended, good bowel sounds. No guarding or rebound. No masses. There is a well-healed right lower quadrant McBurney incision. The patient has a very large left inguinal hernia that is visible externally and protrudes on Valsalva. The patient has a smaller right inguinal hernia that is protruding on Valsalva and standing examination. Penis is normal and circumcised. Both testes are present descended bilaterally. There is no hydrocele or extra cord structures. I had a long conversation/consultation with this patient once again regarding hernia repairs, mesh, techniques, outcomes, activity after surgery, and timing of the operation. They are planning international travel and have a busy summer.   It is an acceptable risk to wait until they get back. We are currently scheduled to operate on October 13, 2023. This patient will undergo bilateral inguinal herniorrhaphy with mesh plug technique. All risks, benefits, complications and alternatives to the proposed procedure(s) were fully discussed with the patient. All questions from the patient were answered in detail. A description of the procedure(s) and possible outcomes was fully discussed. The patient seemed to understand the conversation and its details. Consent for the procedure(s) was confirmed with the patient.             Sincerely,       Lianna Toro MD   CC: Hilad Johnson MD

## (undated) NOTE — LETTER
22    Patient: Dominica Gaucher  : 1960 Visit date: 2022    Dear  Renee Blankenship MD    Thank you for referring Dominica Gaucher to my practice. Please find my assessment and plan below. Assessment   Bilateral inguinal hernia without obstruction or gangrene, recurrence not specified  (primary encounter diagnosis)  History of appendectomy, age 10  History of colon polyps      Plan   I am seeing this patient in consultation regarding bilateral inguinal hernias. He is referred by his gastroenterologist, Dr. Eloina Pruett. The patient has a history of colon polyps. The patient is a professor at [employer], [employer]. He does not recall any specific event that led to the formation of either hernia. The patient's wife is present and states that he does a lot of activity that involves lifting. He states that the symptoms are becoming progressively worse. He has had symptoms for at least 5 years. He is up-to-date with colonoscopy in . The patient states that his symptoms are worse with prolonged standing. He gets bloating and gas as his main symptoms. Symptoms are worse when he coughs and sneezes. The patient states his symptoms are better when he is taking Metamucil. The patient's only significant abdominal operation was a prior appendectomy at age 10. Clinical exam reveals his abdomen to be soft, nontender, nondistended, good bowel sounds. No guarding or rebound. No masses. No ascites. Liver is normal, spleen is not palpable. Patient has normal bowel sounds. The patient has a very large left inguinal hernia greater than 8 cm, the patient has a smaller right inguinal hernia that is visible at approximately 5 cm. Both testes are present and descended. There are no extra cord structures. The penis is normal and circumcised. The patient has a right lower quadrant transverse incision just above the anterior superior iliac spine.   There are no umbilical or incisional hernias present. The patient and his wife had extreme concerns about hernia repairs. The first concern was with general anesthesia. I explained to the that the risk of general anesthesia is the same as flying in an airplane. There are some risks, but on a normal healthy 57year-old, these would be extremely minimal.  Any operation on a bilateral hernia will require some sedation. Most of the same drugs are used for sedation as are used in general anesthesia minus the paralytics. The second concern was with mesh. I explained to them that the mesh that is being advertised negatively on TV is from a combination of 2 recent medical events. 1 is that vaginal mesh tends to cause erosion into the rectum. This will not affect this patient. The second medical event that happened recently was a specific brand name of mesh that had very specific problems related only to that brand name of mesh. I further explained that the mesh that I use has been around since the 1970s. Although mesh can cause problems, this 1 has no specific problem related to this particular brand name. Their third concern was with the insufflation of CO2 during a laparoscopic procedure. I explained to them we used CO2 because the body can absorb it and the patient breathes it out. There is no other cast that we use for laparoscopy. All of the above listed discussions went on for approximately 60 minutes. At the conclusion, it appears that they are seeking alternate opinions. I told them that I felt the best procedure for this patient would be a laparoscopic bilateral inguinal hernia with mesh. The only significant workable alternative would be bilateral inguinal hernia repair using mesh plug technique with inguinal incisions bilaterally. This can be done with heavy sedation and local anesthesia.     They will think about it and call me back if they desire me to proceed with their surgical intervention.         Sincerely,       Shruti Calderón MD   CC: Tho Frederick MD